# Patient Record
Sex: MALE | Race: WHITE | NOT HISPANIC OR LATINO | Employment: FULL TIME | URBAN - METROPOLITAN AREA
[De-identification: names, ages, dates, MRNs, and addresses within clinical notes are randomized per-mention and may not be internally consistent; named-entity substitution may affect disease eponyms.]

---

## 2017-02-15 ENCOUNTER — OFFICE VISIT - HEALTHEAST (OUTPATIENT)
Dept: FAMILY MEDICINE | Facility: CLINIC | Age: 47
End: 2017-02-15

## 2017-02-15 DIAGNOSIS — H10.13 ALLERGIC CONJUNCTIVITIS, BILATERAL: ICD-10-CM

## 2017-02-15 ASSESSMENT — MIFFLIN-ST. JEOR: SCORE: 1878.23

## 2017-02-21 ENCOUNTER — COMMUNICATION - HEALTHEAST (OUTPATIENT)
Dept: FAMILY MEDICINE | Facility: CLINIC | Age: 47
End: 2017-02-21

## 2017-03-10 ENCOUNTER — HOSPITAL ENCOUNTER (EMERGENCY)
Facility: CLINIC | Age: 47
Discharge: HOME | End: 2017-03-10
Attending: EMERGENCY MEDICINE | Admitting: EMERGENCY MEDICINE

## 2017-03-10 ENCOUNTER — APPOINTMENT (OUTPATIENT)
Dept: GENERAL RADIOLOGY | Facility: CLINIC | Age: 47
End: 2017-03-10
Attending: EMERGENCY MEDICINE

## 2017-03-10 VITALS
BODY MASS INDEX: 28.44 KG/M2 | SYSTOLIC BLOOD PRESSURE: 146 MMHG | WEIGHT: 210 LBS | DIASTOLIC BLOOD PRESSURE: 79 MMHG | TEMPERATURE: 98.2 F | RESPIRATION RATE: 18 BRPM | HEIGHT: 72 IN | OXYGEN SATURATION: 99 %
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
## 2017-03-10 DIAGNOSIS — S61.211A LACERATION OF LEFT INDEX FINGER WITHOUT FOREIGN BODY WITHOUT DAMAGE TO NAIL, INITIAL ENCOUNTER: ICD-10-CM

## 2017-03-10 DIAGNOSIS — S61.219A FINGER LACERATION, INITIAL ENCOUNTER: ICD-10-CM

## 2017-03-10 PROCEDURE — 99283 EMERGENCY DEPT VISIT LOW MDM: CPT | Mod: 25 | Performed by: EMERGENCY MEDICINE

## 2017-03-10 PROCEDURE — 12001 RPR S/N/AX/GEN/TRNK 2.5CM/<: CPT | Mod: Z6 | Performed by: EMERGENCY MEDICINE

## 2017-03-10 PROCEDURE — 99283 EMERGENCY DEPT VISIT LOW MDM: CPT | Performed by: EMERGENCY MEDICINE

## 2017-03-10 PROCEDURE — 73140 X-RAY EXAM OF FINGER(S): CPT | Mod: LT

## 2017-03-10 PROCEDURE — 12001 RPR S/N/AX/GEN/TRNK 2.5CM/<: CPT | Performed by: EMERGENCY MEDICINE

## 2017-03-10 RX ORDER — LIDOCAINE HYDROCHLORIDE AND EPINEPHRINE 10; 10 MG/ML; UG/ML
1 INJECTION, SOLUTION INFILTRATION; PERINEURAL
Status: DISCONTINUED | OUTPATIENT
Start: 2017-03-10 | End: 2017-03-10 | Stop reason: HOSPADM

## 2017-03-10 ASSESSMENT — ENCOUNTER SYMPTOMS
SHORTNESS OF BREATH: 0
FEVER: 0
ABDOMINAL PAIN: 0

## 2017-03-10 NOTE — ED AVS SNAPSHOT
[facility], [place], Emergency Department    [address] [place] [address]    Phone:  [phone]                                       Alexy Bridges   MRN: [id number]    Department:  [facility], Emergency Department   Date of Visit:  3/10/2017           After Visit Summary Signature Page     I have received my discharge instructions, and my questions have been answered. I have discussed any challenges I see with this plan with the nurse or doctor.    ..........................................................................................................................................  Patient/Patient Representative Signature      ..........................................................................................................................................  Patient Representative Print Name and Relationship to Patient    ..................................................               ................................................  Date                                            Time    ..........................................................................................................................................  Reviewed by Signature/Title    ...................................................              ..............................................  Date                                                            Time

## 2017-03-10 NOTE — DISCHARGE INSTRUCTIONS
Please make an appointment to follow up with Your Primary Care Provider for suture removal in 10-14 days.

## 2017-03-10 NOTE — ED AVS SNAPSHOT
" [facility], Emergency Department    [address] [facility] [address]    Phone:  [phone]                                       Alexy Bridges   MRN: [id number]    Department:  [facility], Emergency Department   Date of Visit:  3/10/2017           Patient Information     Date Of Birth          1970        Your diagnoses for this visit were:     Finger laceration, initial encounter        You were seen by Riley Sin MD.        Discharge Instructions       Please make an appointment to follow up with Your Primary Care Provider for suture removal in 10-14 days.      Discharge References/Attachments     LACERATION, HAND: ALL CLOSURES (ENGLISH)      24 Hour Appointment Hotline       To make an appointment at any [facility] clinic, call [phone] ([phone]). If you don't have a family doctor or clinic, we will help you find one. [facility] clinics are conveniently located to serve the needs of you and your family.             Review of your medicines      Notice     You have not been prescribed any medications.            Procedures and tests performed during your visit     Fingers XR, 2-3 views, left      Orders Needing Specimen Collection     None      Pending Results     No orders found from 3/8/2017 to 3/11/2017.            Pending Culture Results     No orders found from 3/8/2017 to 3/11/2017.            Thank you for choosing [facility]       Thank you for choosing [facility] for your care. Our goal is always to provide you with excellent care. Hearing back from our patients is one way we can continue to improve our services. Please take a few minutes to complete the written survey that you may receive in the mail after you visit with us. Thank you!        [employer] Information     [employer] lets you send messages to your doctor, view your test results, renew your prescriptions, schedule appointments and more. To sign up, go to [email or web address] . Click on \"Log in\" on the left " "side of the screen, which will take you to the Welcome page. Then click on \"Sign up Now\" on the right side of the page.     You will be asked to enter the access code listed below, as well as some personal information. Please follow the directions to create your username and password.     Your access code is: [email or web address]  Expires: 2017  1:25 PM     Your access code will  in 90 days. If you need help or a new code, please call your [facility] clinic or [phone].        Care EveryWhere ID     This is your Care EveryWhere ID. This could be used by other organizations to access your [facility] medical records  [id number]        After Visit Summary       This is your record. Keep this with you and show to your community pharmacist(s) and doctor(s) at your next visit.                  "

## 2017-03-10 NOTE — ED NOTES
Pt's finger dressed. He was provided with wound care instructions and dressing change supplies. He verbalized understanding and denied further questions. Ready to D/C.

## 2017-03-10 NOTE — ED NOTES
Alert orientated ambulatory patient presents to ER triage with c/o:    1.) Finger injury    Hx: Belt arcadio vs Left pointer finger.  This injury occurred at place of employment.  Patient believes he had tetanus shot within past 5 years.      Airway WDLs  Breathing WDLs  Circulation WDLs

## 2017-03-10 NOTE — ED PROVIDER NOTES
History     Chief Complaint   Patient presents with     Laceration     HPI  Alexy Bridges is a 47 year old right hand dominant male who presents to the Emergency Department for evaluation of a laceration. Twenty minutes ago, the patient was at work using a belt arcadio when his left pointer finger got caught, sustaining a laceration. He denies any other concerns or complaints at this time. Tetanus up to date as of May 2016.    No past medical history on file.    No past surgical history on file.    No family history on file.    Social History   Substance Use Topics     Smoking status: Not on file     Smokeless tobacco: Not on file     Alcohol use Not on file       No current facility-administered medications for this encounter.      No current outpatient prescriptions on file.      No Known Allergies     I have reviewed the Medications, Allergies, Past Medical and Surgical History, and Social History in the Epic system.    Review of Systems   Constitutional: Negative for fever.   Respiratory: Negative for shortness of breath.    Cardiovascular: Negative for chest pain.   Gastrointestinal: Negative for abdominal pain.   Skin:        Positive for laceration to left pointer finger.   All other systems reviewed and are negative.      Physical Exam   BP: 146/79  Heart Rate: 80  Temp: 98.2  F (36.8  C)  Resp: 18  Height: 182.9 cm (6')  Weight: 95.3 kg (210 lb)  SpO2: 99 %  Physical Exam   Constitutional: He is oriented to person, place, and time. He appears well-developed and well-nourished. No distress.   HENT:   Head: Normocephalic and atraumatic.   Eyes: No scleral icterus.   Neck: Normal range of motion. Neck supple.   Pulmonary/Chest: No respiratory distress.   Musculoskeletal:        Hands:  Neurological: He is alert and oriented to person, place, and time.   Skin: Skin is warm and dry. No rash noted. He is not diaphoretic. No erythema. No pallor.       ED Course     11:22 AM  The patient was seen and examined by   [facility] in Room 23.     ED Course     Laceration repair  Date/Time: 3/10/2017 12:49 AM  Performed by: TRES KAY  Authorized by: TRES KAY   Consent: Verbal consent obtained.  Consent given by: patient  Patient identity confirmed: verbally with patient  Body area: upper extremity  Location details: left index finger  Laceration length: 2 cm  Foreign bodies: no foreign bodies  Tendon involvement: none  Nerve involvement: none  Vascular damage: no  Anesthesia: digital block    Anesthesia:  Anesthesia: digital block  Local Anesthetic: lidocaine 1% with epinephrine   Anesthetic total: 3 mL  Irrigation solution: saline  Irrigation method: syringe  Debridement: none  Degree of undermining: none  Skin closure: 4-0 nylon  Number of sutures: 7  Technique: simple  Approximation: close  Approximation difficulty: simple  Dressing: 4x4 sterile gauze                   Critical Care time:  none         Results for orders placed or performed during the hospital encounter of 03/10/17   Fingers XR, 2-3 views, left    Narrative    Exam: 3 views left second digit radiographs    HISTORY: Large laceration. Assess for bony involvement.    COMPARISON: None.    FINDINGS:    PA, oblique, lateral view left second digit were obtained.    There is soft tissue defect over the left second digit at the level of  the distal interphalangeal joint and more distally. Associated soft  tissue swelling. No evidence of acute osseous abnormality. There is no  suspicious osteolysis of the underlying bone. There is no radiopaque  foreign body. Mild degenerative changes of second distal and proximal  interphalangeal joints.      Impression    IMPRESSION:  1. No acute osseous abnormality.  2. No radiopaque foreign body.    YAYO TIKA              Labs Ordered and Resulted from Time of ED Arrival Up to the Time of Departure from the ED - No data to display    Assessments & Plan (with Medical Decision Making)   This is a 47-year-old  male presenting to the emergency room with a large laceration on his left index finger after getting his finger caught in a sand belt.  X-ray confirms that there is no foreign body and no bone involvement.  The wound was sutured here in the emergency room.  At this time the patient will be discharged with after care instructions.  Tetanus was already updated last year.  At this time the patient will return an approximate 10-14 days for wound assessment and possible suture removal.    I have reviewed the nursing notes.    I have reviewed the findings, diagnosis, plan and need for follow up with the patient.    There are no discharge medications for this patient.      Final diagnoses:   Finger laceration, initial encounter     IGilda, am serving as a trained medical scribe to document services personally performed by Riley Sin MD, based on the provider's statements to me.      IRiley MD, was physically present and have reviewed and verified the accuracy of this note documented by Gilda Castillo.     3/10/2017   [facility], [place], EMERGENCY DEPARTMENT     Riley Sin MD  03/13/17 0051

## 2017-03-13 ENCOUNTER — COMMUNICATION - HEALTHEAST (OUTPATIENT)
Dept: FAMILY MEDICINE | Facility: CLINIC | Age: 47
End: 2017-03-13

## 2017-03-20 ENCOUNTER — OFFICE VISIT - HEALTHEAST (OUTPATIENT)
Dept: FAMILY MEDICINE | Facility: CLINIC | Age: 47
End: 2017-03-20

## 2017-03-20 DIAGNOSIS — H11.9 CONJUNCTIVA DISORDER: ICD-10-CM

## 2019-06-12 ENCOUNTER — OFFICE VISIT - HEALTHEAST (OUTPATIENT)
Dept: FAMILY MEDICINE | Facility: CLINIC | Age: 49
End: 2019-06-12

## 2019-06-12 DIAGNOSIS — H10.13 ALLERGIC CONJUNCTIVITIS, BILATERAL: ICD-10-CM

## 2021-01-31 ENCOUNTER — RECORDS - HEALTHEAST (OUTPATIENT)
Dept: ADMINISTRATIVE | Facility: OTHER | Age: 51
End: 2021-01-31

## 2021-05-13 ENCOUNTER — OFFICE VISIT - HEALTHEAST (OUTPATIENT)
Dept: FAMILY MEDICINE | Facility: CLINIC | Age: 51
End: 2021-05-13

## 2021-05-13 ENCOUNTER — COMMUNICATION - HEALTHEAST (OUTPATIENT)
Dept: TELEHEALTH | Facility: CLINIC | Age: 51
End: 2021-05-13

## 2021-05-13 ENCOUNTER — RECORDS - HEALTHEAST (OUTPATIENT)
Dept: GENERAL RADIOLOGY | Facility: CLINIC | Age: 51
End: 2021-05-13

## 2021-05-13 DIAGNOSIS — M25.552 PAIN IN LEFT HIP: ICD-10-CM

## 2021-05-13 DIAGNOSIS — M25.552 HIP PAIN, LEFT: ICD-10-CM

## 2021-05-13 DIAGNOSIS — N50.89 MASS OF LEFT TESTICLE: ICD-10-CM

## 2021-05-17 ENCOUNTER — HOSPITAL ENCOUNTER (OUTPATIENT)
Dept: ULTRASOUND IMAGING | Facility: CLINIC | Age: 51
Discharge: HOME | End: 2021-05-17
Attending: FAMILY MEDICINE
Payer: COMMERCIAL

## 2021-05-17 ENCOUNTER — COMMUNICATION - HEALTHEAST (OUTPATIENT)
Dept: FAMILY MEDICINE | Facility: CLINIC | Age: 51
End: 2021-05-17

## 2021-05-17 DIAGNOSIS — N50.89 MASS OF LEFT TESTICLE: ICD-10-CM

## 2021-05-25 ENCOUNTER — RECORDS - HEALTHEAST (OUTPATIENT)
Dept: ADMINISTRATIVE | Facility: CLINIC | Age: 51
End: 2021-05-25

## 2021-05-27 VITALS
BODY MASS INDEX: 30.3 KG/M2 | WEIGHT: 223.4 LBS | DIASTOLIC BLOOD PRESSURE: 76 MMHG | SYSTOLIC BLOOD PRESSURE: 126 MMHG | HEART RATE: 74 BPM | OXYGEN SATURATION: 98 %

## 2021-05-29 ENCOUNTER — RECORDS - HEALTHEAST (OUTPATIENT)
Dept: ADMINISTRATIVE | Facility: CLINIC | Age: 51
End: 2021-05-29

## 2021-05-29 NOTE — PROGRESS NOTES
Assessment/Plan:    1. Allergic conjunctivitis, bilateral  Suspect bilateral allergic conjunctivitis based on history and exam.  Patient reports significant improvement in the past with prednisolone drops, discussed reasonable to use this therapy again, prescription sent to pharmacy.  Encourage patient to refrain from contact use until symptoms have resolved.  Discussed consideration of oral allergy medication trial.  - prednisoLONE acetate (PRED-FORTE) 1 % ophthalmic suspension; Administer 2 drops to both eyes 3 (three) times a day for 10 days.  Dispense: 5 mL; Refill: 0      Follow up: as needed for persistent/worsening symptoms, 1 yr for physical    Jessenia Seo MD  [facility]    Subjective:    Patient ID: Alexy Bridges is a 49 y.o. male is here today for itchy/red eyes    Itchy/red eyes  -similar issues in the past - wondering if allergies or pollen; works as [employer]  -started 2 weeks ago  -has been using antihistamine drops (but only used a little because was ), felt like steroid drops were helpful in the past  -hasn't been using any oral allergy medication  -does wear contacts but not wearing currently  -saw Dr Garner in 2017 for this issue and then previously Dr Campbell  -sees eye doctor once every 2 years - they are aware of this recurrent issue with the eyes    No past medical history on file.  No past surgical history on file.  Current Outpatient Medications on File Prior to Visit   Medication Sig Dispense Refill     MULTIVITAMIN ORAL Take by mouth.       OMEGA-3/DHA/EPA/FISH OIL (FISH OIL-OMEGA-3 FATTY ACIDS) 300-1,000 mg capsule Take 1 g by mouth daily.       No current facility-administered medications on file prior to visit.      Allergies   Allergen Reactions     Tobramycin Swelling     Eye drops     Social History     Socioeconomic History     Marital status:      Spouse name: Not on file     Number of children: Not on file     Years of education: Not on file      Highest education level: Not on file   Occupational History     Not on file   Social Needs     Financial resource strain: Not on file     Food insecurity:     Worry: Not on file     Inability: Not on file     Transportation needs:     Medical: Not on file     Non-medical: Not on file   Tobacco Use     Smoking status: Former Smoker     Packs/day: 0.50     Years: 10.00     Pack years: 5.00     Last attempt to quit:      Years since quittin.4     Smokeless tobacco: Never Used   Substance and Sexual Activity     Alcohol use: Yes     Frequency: 2-3 times a week     Drinks per session: 1 or 2     Binge frequency: Never     Drug use: No     Sexual activity: Not on file   Lifestyle     Physical activity:     Days per week: Not on file     Minutes per session: Not on file     Stress: Not on file   Relationships     Social connections:     Talks on phone: Not on file     Gets together: Not on file     Attends [employer] service: Not on file     Active member of club or organization: Not on file     Attends meetings of clubs or organizations: Not on file     Relationship status: Not on file     Intimate partner violence:     Fear of current or ex partner: Not on file     Emotionally abused: Not on file     Physically abused: Not on file     Forced sexual activity: Not on file   Other Topics Concern     Not on file   Social History Narrative     Not on file     Review of systems is as stated in HPI, and the remainder of system review is otherwise negative.    Objective:      /70   Pulse 67   Wt (!) 222 lb 3 oz (100.8 kg)   BMI 30.13 kg/m      General appearance: awake, NAD  HEENT: atraumatic, normocephalic, PERRL, bilateral scleral injection without discharge or watering, ears and nose grossly normal, moist mucous membranes  Neck: normal ROM  Lungs: breathing comfortably on room air  Skin: no rashes or lesions  Neuro: alert, oriented x3, CNs grossly intact, no focal deficits appreciated  Psych: normal  mood/affect/behavior, answering questions appropriately, linear thought process

## 2021-05-30 VITALS — HEIGHT: 72 IN | WEIGHT: 215 LBS | BODY MASS INDEX: 29.12 KG/M2

## 2021-05-30 VITALS — BODY MASS INDEX: 28.96 KG/M2 | WEIGHT: 213.5 LBS

## 2021-06-02 ENCOUNTER — RECORDS - HEALTHEAST (OUTPATIENT)
Dept: ADMINISTRATIVE | Facility: CLINIC | Age: 51
End: 2021-06-02

## 2021-06-03 VITALS — BODY MASS INDEX: 30.13 KG/M2 | WEIGHT: 222.19 LBS

## 2021-06-09 NOTE — PROGRESS NOTES
Assessment:     Alexy was seen today for dry eye.    Diagnoses and all orders for this visit:    Conjunctiva disorder  -     prednisoLONE acetate (PRED MILD) 0.12 % ophthalmic suspension; Use 2 drops bid to both eyes.        Plan:     1. Conjunctiva disorder  My plan for patient is this.  He is to keep his contacts out and wear his glasses.  He is to use the os mild steroid eyedrop 2 drops twice a day over the next 5-7 days.  If the eye heals well he switched to keep the contacts out of his eyes and additional 5 days.  He can then resume contacts after this.  If I does not heal well he is to make an appointment with [facility] Dr. Susan Quick one of her partners.  He is to remain free of wearing contacts up until the time he sees the ophthalmic doctors.  I tell him he'll save a lot of time that way otherwise he'll just need a trial of being out of his contacts.  Further plans pinning course.    Please note patient has a laceration and avulsion on his index finger on the right area after inspecting of this finger we decide not to remove the Steri-Strips currently at 10 days time but he should wait about 14 days time and then will soak it and remove the Steri-Strips.  He'll come back and see me or Dr. Sonam Campbell his primary.  - prednisoLONE acetate (PRED MILD) 0.12 % ophthalmic suspension; Use 2 drops bid to both eyes.  Dispense: 5 mL; Refill: 0            Subjective:      Nora is a 47 y.o. male presenting to my clinic for follow-up of eye problem.  Patient has conjunctival issues and was given an antihistamine eyedrop in January.  This did not seem to work and an antibiotic eyedrop ciprofloxacin was used.  That didn't seem to help either.  Throughout this time patient's been wearing his contacts and says putting in his contacts doesn't seem to irritate him.  He does not have any purulent drainage about the eyes.  He is not complaining of upper respiratory infection.  He does not complain of sinus  problems or cough.  He's had no fevers.    He once had this problem before and is steroid eyedrop helped him.  I'm thinking that he is overusing his contacts causing a chronic irritation.  I put a planning place for steroid drops and then follow-up with ophthalmologist at [facility] if needed.      Patient's had any avulsion laceration on his right index finger.  It's been stitched and Steri-Strips are placed.  He is due to have sutures removed today is day 10 and he is tenuous about removing Steri-Strips at this point and we suggest he wait a day for 14 before area is uncovered further.      Current Outpatient Prescriptions on File Prior to Visit   Medication Sig Dispense Refill     MULTIVITAMIN ORAL Take by mouth.       OMEGA-3/DHA/EPA/FISH OIL (FISH OIL-OMEGA-3 FATTY ACIDS) 300-1,000 mg capsule Take 1 g by mouth daily.       azelastine (OPTIVAR) 0.05 % ophthalmic solution Administer 1 drop to both eyes 2 (two) times a day. 6 mL 1     No current facility-administered medications on file prior to visit.      Allergies   Allergen Reactions     Tobramycin Swelling     Eye drops     History reviewed. No pertinent past medical history.  History reviewed. No pertinent surgical history.  Social History     Social History     Marital status:      Spouse name: N/A     Number of children: N/A     Years of education: N/A     Occupational History     Not on file.     Social History Main Topics     Smoking status: Former Smoker     Smokeless tobacco: Never Used     Alcohol use 2.5 oz/week     5 Standard drinks or equivalent per week     Drug use: No     Sexual activity: Not on file     Other Topics Concern     Not on file     Social History Narrative     Family History   Problem Relation Age of Onset     Pancreatitis Mother      Diabetes Mother      COPD Father      Aortic stenosis Father        ROS:  I have performed a 10 point ROS.  All pertinent positives and negatives are found in the HPI.  All others are negative.     No fever no chills.  Index finger does not hurt.  He says it does not hurt to put his contacts in    Objective:     Physical Exam:  Visit Vitals     /80 (Patient Site: Right Arm, Patient Position: Sitting, Cuff Size: Adult Regular)     Pulse 72     Wt 213 lb 8 oz (96.8 kg)     BMI 28.96 kg/m2     General Appearance: Alert, cooperative, no distress, appears stated age  Head: Normocephalic, without obvious abnormality, atraumatic  Eyes: Extraocular motions are intact.  Pupils are equally round and reactive to light.  Patient has conjunctival irritation most prominent on the edges of his contacts.  Contacts are present currently in his eyes./  No irritation wearing the contacts he tells me  Ears: Normal TM's and external ear canals, both ears  Nose: Nares normal, septum midline,mucosa normal, no drainage/no symptoms of upper respiratory infection or sinus pain  Throat: Lips, mucosa, and tongue normal; teeth and gums normal/minimal clear drainage down throat  Neck: Supple, symmetrical, trachea midline, no adenopathy;  thyroid: not enlarged, symmetric, no tenderness/mass/nodules; no carotid bruit or JVD  Lungs: Clear to auscultation bilaterally, respirations unlabored  Heart: Regular rate and rhythm, S1 and S2 normal, no murmur, rub, or gallop,     Skin: Skin color, texture, turgor normal, no rashes or lesions/no periorbital skin problems  Lymph nodes: Cervical, supraclavicular, and axillary nodes normal  Neurologic: Intact, no focal deficits   Mental status:  Appropriate, Affect normal    I reviewed notes and phone calls and mid history over the past 3 months     Patient has a right index finger that is has any vault flap is sutured and Steri-Stripped.  Dried blood at the edges.  Steri-Strips still intact.  He is currently at 10 days time.  No significant pain or swelling or edema around this site.  We suggest he wait to 14 days to take the sutures out which will be a Thursday.  He can come back to see myself or  Dr. Campbell.

## 2021-06-09 NOTE — PROGRESS NOTES
Assessment/Plan:     1. Allergic conjunctivitis, bilateral  Suspect allergic vs infectious due to somewhat recurrent nature. Recommended eye kit for further eval but pt declines today. States will return if not improving. May also need to consider autoimmune if reoccurs. Also advised to not wear contacts until healed and wear eye protection at work.   - azelastine (OPTIVAR) 0.05 % ophthalmic solution; Administer 1 drop to both eyes 2 (two) times a day.  Dispense: 6 mL; Refill: 1        Subjective:      Alexy Bridges is a 47 y.o. male comes in today c/o possible pink eye. States it has been going on for 2 weeks. 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 No pain in eyes and no changes in vision but states itching. And feels dry. Mild amount of matting in corner when wakes up but no other significant discharge. Sometimes looks a little red. No fever or ather allergy symptoms. States still wearing contacts but washes them out every night.  States similar symptoms in the past and was given tobramycin drops which seemed to work but caused swelling so he thinks he is allergic to them.  No other new concerns    Current Outpatient Prescriptions   Medication Sig Dispense Refill     MULTIVITAMIN ORAL Take by mouth.       OMEGA-3/DHA/EPA/FISH OIL (FISH OIL-OMEGA-3 FATTY ACIDS) 300-1,000 mg capsule Take 1 g by mouth daily.       azelastine (OPTIVAR) 0.05 % ophthalmic solution Administer 1 drop to both eyes 2 (two) times a day. 6 mL 1     No current facility-administered medications for this visit.        Past Medical History, Family History, and Social History reviewed.  No past medical history on file.  No past surgical history on file.  Tobramycin  Family History   Problem Relation Age of Onset     Pancreatitis Mother      Diabetes Mother      COPD Father      Aortic stenosis Father      Social History     Social History     Marital status:      Spouse name: N/A     Number of children:  N/A     Years of education: N/A     Occupational History     Not on file.     Social History Main Topics     Smoking status: Former Smoker     Smokeless tobacco: Never Used     Alcohol use 2.5 oz/week     5 Standard drinks or equivalent per week     Drug use: No     Sexual activity: Not on file     Other Topics Concern     Not on file     Social History Narrative         Review of systems is as stated in HPI, and the remainder of the 10 system review is otherwise negative.    Objective:     Vitals:    02/15/17 0856   BP: 110/64   Patient Site: Left Arm   Patient Position: Sitting   Cuff Size: Adult Regular   Temp: 97.8  F (36.6  C)   TempSrc: Oral   Weight: 215 lb (97.5 kg)   Height: 6' (1.829 m)    Body mass index is 29.16 kg/(m^2).    General Appearance:    Alert, cooperative, no distress, appears stated age   Head:    Normocephalic, without obvious abnormality, atraumatic   Eyes:    PERRL, EOM's intact, no discharge, mildly erythematous sclera and conjunctiva   Ears:    Normal TM's and external ear canals   Nose:   Mucosa normal, no drainage     or sinus tenderness   Throat:   Oropharynx is clear   Neck:   Supple, symmetrical, no adenopathy, no thyromegally, no carotid bruitis       Lungs:     Clear to auscultation bilaterally, respirations unlabored        Heart:    Regular rate and rhythm, S1 and S2 normal, no murmur, rub    or gallop                   Extremities:   Extremities normal, atraumatic, no cyanosis or edema       Skin:   No rashes or lesions         This note has been dictated using voice recognition software. Any grammatical or context distortions are unintentional and inherent to the the software.

## 2021-06-17 NOTE — PROGRESS NOTES
Assessment:     1. Hip pain, left  XR Hip Left 2 or More VWS    naproxen (NAPROSYN) 500 MG tablet   2. Mass of left testicle  US Scrotum and Testicles W Duplex Ltd       Plan:     1. Hip pain, left  Patient will try the medication for 2 or 3 weeks if no improvement would consider further imaging referral or corticosteroid injection  - XR Hip Left 2 or More VWS; Future  - naproxen (NAPROSYN) 500 MG tablet; Take 1 tablet (500 mg total) by mouth 2 (two) times a day with meals.  Dispense: 60 tablet; Refill: 2    2. Mass of left testicle  Probably benign mass just above the left testicle  - US Scrotum and Testicles W Duplex Ltd; Future      Subjective:   Patient here because he played golf and went to pick his card up and developed pain in his left hip which is been intermittent and seems to be relieved by sitting no previous problems with his hip he does have chronic low back problems and sees a chiropractor for that but his manipulations did not help that.  Today's examination ruled out a hernia however on the examination we did notice a mass just above the left testicle which we will ultrasound but I think is benign range of motion hip was normal I could not elicit any click and patient does not have any symptoms of snapping hip.  I did not see anything demonstrable on the plain films but we will defer to radiology for that I will treat him with some anti-inflammatories for 2 weeks he does have some hyper calcific areas in his femur which look like bone islands to me but we will concur with radiology.  I advised him to do monthly testicle checks and to come in at age 52 for complete examination his primary physician is Dr. Seo time will tell her success with the hip.    Review of Systems: A complete 14 point review of systems was obtained and is negative or as stated in the history of present illness.    No past medical history on file.  Family History   Problem Relation Age of Onset     Pancreatitis Mother       Diabetes Mother      COPD Father      Aortic stenosis Father      No Medical Problems Sister      No Medical Problems Brother      No Medical Problems Sister      No past surgical history on file.  Social History     Tobacco Use     Smoking status: Former Smoker     Packs/day: 0.50     Years: 10.00     Pack years: 5.00     Quit date:      Years since quittin.3     Smokeless tobacco: Never Used   Substance Use Topics     Alcohol use: Yes     Frequency: 2-3 times a week     Drinks per session: 1 or 2     Binge frequency: Never     Drug use: No         Objective:   /76   Pulse 74   Wt (!) 223 lb 6.4 oz (101.3 kg)   SpO2 98%   BMI 30.30 kg/m      General Appearance:  Normal  Head:  Normal  Ears: Normal  Eyes:  Normal  Nose:  Normal  Throat:  Normal  Neck:  Normal  Back:  Normal  Chest/Breast:Normal  Lungs:  Normal  Heart:  Normal  Abdomen:  Normal  Musculoskeletal:  Normal  Lymphatic:  Normal  Skin/Hair/Nails:  Normal  Neurologic:  Normal  Extremities: Difficult to elicit tenderness in left hip no click could be confirmed on flexion or extension or external rotation  Genitourinary: Soft kidney bean sized mass supra testicular area on cord  Pulses:  Normal           This note has been dictated using voice recognition software. Any grammatical or context distortions are unintentional and inherent to the the software.

## 2021-07-25 ENCOUNTER — HEALTH MAINTENANCE LETTER (OUTPATIENT)
Age: 51
End: 2021-07-25

## 2021-09-19 ENCOUNTER — HEALTH MAINTENANCE LETTER (OUTPATIENT)
Age: 51
End: 2021-09-19

## 2021-10-05 ENCOUNTER — OFFICE VISIT (OUTPATIENT)
Dept: FAMILY MEDICINE | Facility: CLINIC | Age: 51
End: 2021-10-05
Payer: COMMERCIAL

## 2021-10-05 VITALS
SYSTOLIC BLOOD PRESSURE: 122 MMHG | BODY MASS INDEX: 30.66 KG/M2 | DIASTOLIC BLOOD PRESSURE: 62 MMHG | OXYGEN SATURATION: 97 % | HEART RATE: 57 BPM | WEIGHT: 226.1 LBS

## 2021-10-05 DIAGNOSIS — H10.13 ALLERGIC CONJUNCTIVITIS, BILATERAL: Primary | ICD-10-CM

## 2021-10-05 PROCEDURE — 99213 OFFICE O/P EST LOW 20 MIN: CPT | Performed by: NURSE PRACTITIONER

## 2021-10-05 RX ORDER — CHLORAL HYDRATE 500 MG
1 CAPSULE ORAL
COMMUNITY

## 2021-10-05 RX ORDER — PREDNISOLONE ACETATE 10 MG/ML
2 SUSPENSION/ DROPS OPHTHALMIC 3 TIMES DAILY
Qty: 5 ML | Refills: 0 | Status: SHIPPED | OUTPATIENT
Start: 2021-10-05 | End: 2021-10-15

## 2021-10-05 RX ORDER — MULTIVITAMIN,THERAPEUTIC
1 TABLET ORAL DAILY
COMMUNITY

## 2021-10-05 NOTE — PROGRESS NOTES
Assessment and Plan:     Allergic conjunctivitis, bilateral  Discussed treatment options.  Provided prescription for prednisolone.  Educated on its indications and side effects.  Recommend applying cool compresses.  He is to avoid rubbing his eyes.  If no improvement in symptoms, he is to follow-up with his PCP.  He is content with the plan.  - prednisoLONE acetate (PRED FORTE) 1 % ophthalmic suspension  Dispense: 5 mL; Refill: 0      Subjective:     Alexy is a 51 year old male presenting to the clinic for concerns for conjunctivitis.  Patient states he has been remodeling and working around dust.  Over the past 3 weeks, he has had red eyes.  He does wear contact lens.  He has tried Visine with minimal relief.  Eyes are dry.  He denies any itching.  He has not had any discharge from the eyes.  He denies foreign body, photophobia, eye pain, decrease in visual acuity.  He denies recent allergy symptoms including rhinorrhea, postnasal drainage, cough, sneezing.  He has not had any recent cold symptoms otherwise.  He has tried antihistamine eyedrops in the past with no relief.  He typically requires prednisolone for relief.    Reviewof Systems: A complete 14 point review of systems was obtained and is negative or as stated in the history of present illness.    Social History     Socioeconomic History     Marital status:      Spouse name: Not on file     Number of children: Not on file     Years of education: Not on file     Highest education level: Not on file   Occupational History     Not on file   Tobacco Use     Smoking status: Former Smoker     Packs/day: 0.50     Years: 10.00     Pack years: 5.00     Quit date: 2007     Years since quittin.7     Smokeless tobacco: Never Used   Substance and Sexual Activity     Alcohol use: Yes     Drug use: No     Sexual activity: Not on file   Other Topics Concern     Not on file   Social History Narrative     Not on file     Social Determinants of Health      Financial Resource Strain:      Difficulty of Paying Living Expenses:    Food Insecurity:      Worried About Running Out of Food in the Last Year:      Ran Out of Food in the Last Year:    Transportation Needs:      Lack of Transportation (Medical):      Lack of Transportation (Non-Medical):    Physical Activity:      Days of Exercise per Week:      Minutes of Exercise per Session:    Stress:      Feeling of Stress :    Social Connections:      Frequency of Communication with Friends and Family:      Frequency of Social Gatherings with Friends and Family:      Attends Voodoo Services:      Active Member of Clubs or Organizations:      Attends Club or Organization Meetings:      Marital Status:    Intimate Partner Violence:      Fear of Current or Ex-Partner:      Emotionally Abused:      Physically Abused:      Sexually Abused:        Active Ambulatory Problems     Diagnosis Date Noted     No Active Ambulatory Problems     Resolved Ambulatory Problems     Diagnosis Date Noted     No Resolved Ambulatory Problems     No Additional Past Medical History       Family History   Problem Relation Age of Onset     Pancreatitis Mother      Diabetes Mother      Chronic Obstructive Pulmonary Disease Father      Aortic stenosis Father      No Known Problems Sister      No Known Problems Brother      No Known Problems Sister        Objective:     /62 (BP Location: Right arm, Patient Position: Sitting, Cuff Size: Adult Regular)   Pulse 57   Wt 102.6 kg (226 lb 1.6 oz)   SpO2 97%   BMI 30.66 kg/m      Patient is alert, in no obvious distress.   Skin: Warm, dry.  No lesions or rashes.  Skin turgor rapid return.   HEENT:  Head normocephalic, atraumatic.  Eyes normal.  PERRL.  EOM's intact.  No nystagmus. Conjunctiva is injected bilaterally.  Ears normal.  Nose patent, mucosa pink.  Oropharynx mucosa pink.  No lesions or tonsillar enlargement.   Neck: Supple, no lymphadenopathy.   Lungs:  Clear to auscultation.  Respirations even and unlabored.  No wheezing or rales noted.   Heart:  Regular rate and rhythm.  No murmurs.

## 2022-08-21 ENCOUNTER — HEALTH MAINTENANCE LETTER (OUTPATIENT)
Age: 52
End: 2022-08-21

## 2022-11-21 ENCOUNTER — HEALTH MAINTENANCE LETTER (OUTPATIENT)
Age: 52
End: 2022-11-21

## 2023-07-12 ENCOUNTER — HOSPITAL ENCOUNTER (EMERGENCY)
Facility: CLINIC | Age: 53
Discharge: HOME | End: 2023-07-13
Attending: EMERGENCY MEDICINE | Admitting: EMERGENCY MEDICINE
Payer: COMMERCIAL

## 2023-07-12 ENCOUNTER — APPOINTMENT (OUTPATIENT)
Dept: RADIOLOGY | Facility: CLINIC | Age: 53
End: 2023-07-12
Attending: EMERGENCY MEDICINE
Payer: COMMERCIAL
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
## 2023-07-12 DIAGNOSIS — S22.32XA CLOSED FRACTURE OF ONE RIB OF LEFT SIDE, INITIAL ENCOUNTER: ICD-10-CM

## 2023-07-12 DIAGNOSIS — S30.1XXA ABDOMINAL WALL HEMATOMA, INITIAL ENCOUNTER: ICD-10-CM

## 2023-07-12 PROCEDURE — 99285 EMERGENCY DEPT VISIT HI MDM: CPT | Mod: 25

## 2023-07-12 PROCEDURE — 71101 X-RAY EXAM UNILAT RIBS/CHEST: CPT | Mod: LT

## 2023-07-12 PROCEDURE — 96361 HYDRATE IV INFUSION ADD-ON: CPT

## 2023-07-12 PROCEDURE — 96374 THER/PROPH/DIAG INJ IV PUSH: CPT | Mod: 59

## 2023-07-13 ENCOUNTER — APPOINTMENT (OUTPATIENT)
Dept: CT IMAGING | Facility: CLINIC | Age: 53
End: 2023-07-13
Attending: EMERGENCY MEDICINE
Payer: COMMERCIAL

## 2023-07-13 VITALS
SYSTOLIC BLOOD PRESSURE: 119 MMHG | BODY MASS INDEX: 29.12 KG/M2 | OXYGEN SATURATION: 98 % | WEIGHT: 215 LBS | HEIGHT: 72 IN | RESPIRATION RATE: 18 BRPM | HEART RATE: 52 BPM | TEMPERATURE: 97.9 F | DIASTOLIC BLOOD PRESSURE: 77 MMHG

## 2023-07-13 LAB
ALBUMIN SERPL-MCNC: 3.7 G/DL (ref 3.5–5)
ALP SERPL-CCNC: 56 U/L (ref 45–120)
ALT SERPL W P-5'-P-CCNC: 23 U/L (ref 0–45)
ANION GAP SERPL CALCULATED.3IONS-SCNC: 9 MMOL/L (ref 5–18)
AST SERPL W P-5'-P-CCNC: 20 U/L (ref 0–40)
BASOPHILS # BLD AUTO: 0 10E3/UL (ref 0–0.2)
BASOPHILS NFR BLD AUTO: 1 %
BILIRUB SERPL-MCNC: 0.4 MG/DL (ref 0–1)
BUN SERPL-MCNC: 25 MG/DL (ref 8–22)
CALCIUM SERPL-MCNC: 8.9 MG/DL (ref 8.5–10.5)
CHLORIDE BLD-SCNC: 109 MMOL/L (ref 98–107)
CO2 SERPL-SCNC: 23 MMOL/L (ref 22–31)
CREAT SERPL-MCNC: 1.33 MG/DL (ref 0.7–1.3)
EOSINOPHIL # BLD AUTO: 0.1 10E3/UL (ref 0–0.7)
EOSINOPHIL NFR BLD AUTO: 2 %
ERYTHROCYTE [DISTWIDTH] IN BLOOD BY AUTOMATED COUNT: 13.1 % (ref 10–15)
GFR SERPL CREATININE-BSD FRML MDRD: 65 ML/MIN/1.73M2
GLUCOSE BLD-MCNC: 109 MG/DL (ref 70–125)
HCT VFR BLD AUTO: 43.4 % (ref 40–53)
HGB BLD-MCNC: 14.9 G/DL (ref 13.3–17.7)
IMM GRANULOCYTES # BLD: 0 10E3/UL
IMM GRANULOCYTES NFR BLD: 1 %
LYMPHOCYTES # BLD AUTO: 1.9 10E3/UL (ref 0.8–5.3)
LYMPHOCYTES NFR BLD AUTO: 31 %
MCH RBC QN AUTO: 31.8 PG (ref 26.5–33)
MCHC RBC AUTO-ENTMCNC: 34.3 G/DL (ref 31.5–36.5)
MCV RBC AUTO: 93 FL (ref 78–100)
MONOCYTES # BLD AUTO: 0.5 10E3/UL (ref 0–1.3)
MONOCYTES NFR BLD AUTO: 8 %
NEUTROPHILS # BLD AUTO: 3.5 10E3/UL (ref 1.6–8.3)
NEUTROPHILS NFR BLD AUTO: 57 %
NRBC # BLD AUTO: 0 10E3/UL
NRBC BLD AUTO-RTO: 0 /100
PLATELET # BLD AUTO: 205 10E3/UL (ref 150–450)
POTASSIUM BLD-SCNC: 3.9 MMOL/L (ref 3.5–5)
PROT SERPL-MCNC: 6.6 G/DL (ref 6–8)
RBC # BLD AUTO: 4.69 10E6/UL (ref 4.4–5.9)
SODIUM SERPL-SCNC: 141 MMOL/L (ref 136–145)
WBC # BLD AUTO: 6.1 10E3/UL (ref 4–11)

## 2023-07-13 PROCEDURE — 85014 HEMATOCRIT: CPT | Performed by: EMERGENCY MEDICINE

## 2023-07-13 PROCEDURE — 250N000013 HC RX MED GY IP 250 OP 250 PS 637: Performed by: EMERGENCY MEDICINE

## 2023-07-13 PROCEDURE — 258N000003 HC RX IP 258 OP 636: Performed by: EMERGENCY MEDICINE

## 2023-07-13 PROCEDURE — 250N000011 HC RX IP 250 OP 636: Mod: JZ | Performed by: EMERGENCY MEDICINE

## 2023-07-13 PROCEDURE — 80053 COMPREHEN METABOLIC PANEL: CPT | Performed by: EMERGENCY MEDICINE

## 2023-07-13 PROCEDURE — 36415 COLL VENOUS BLD VENIPUNCTURE: CPT | Performed by: EMERGENCY MEDICINE

## 2023-07-13 PROCEDURE — 74177 CT ABD & PELVIS W/CONTRAST: CPT

## 2023-07-13 PROCEDURE — 250N000011 HC RX IP 250 OP 636: Performed by: EMERGENCY MEDICINE

## 2023-07-13 RX ORDER — IOPAMIDOL 755 MG/ML
90 INJECTION, SOLUTION INTRAVASCULAR ONCE
Status: COMPLETED | OUTPATIENT
Start: 2023-07-13 | End: 2023-07-13

## 2023-07-13 RX ORDER — HYDROCODONE BITARTRATE AND ACETAMINOPHEN 5; 325 MG/1; MG/1
2 TABLET ORAL ONCE
Status: COMPLETED | OUTPATIENT
Start: 2023-07-13 | End: 2023-07-13

## 2023-07-13 RX ORDER — KETOROLAC TROMETHAMINE 15 MG/ML
15 INJECTION, SOLUTION INTRAMUSCULAR; INTRAVENOUS ONCE
Status: COMPLETED | OUTPATIENT
Start: 2023-07-13 | End: 2023-07-13

## 2023-07-13 RX ORDER — HYDROCODONE BITARTRATE AND ACETAMINOPHEN 5; 325 MG/1; MG/1
1 TABLET ORAL EVERY 6 HOURS PRN
Qty: 10 TABLET | Refills: 0 | Status: SHIPPED | OUTPATIENT
Start: 2023-07-13 | End: 2023-07-16

## 2023-07-13 RX ADMIN — KETOROLAC TROMETHAMINE 15 MG: 15 INJECTION, SOLUTION INTRAMUSCULAR; INTRAVENOUS at 00:25

## 2023-07-13 RX ADMIN — SODIUM CHLORIDE 1000 ML: 9 INJECTION, SOLUTION INTRAVENOUS at 01:34

## 2023-07-13 RX ADMIN — HYDROCODONE BITARTRATE AND ACETAMINOPHEN 2 TABLET: 5; 325 TABLET ORAL at 00:26

## 2023-07-13 RX ADMIN — IOPAMIDOL 90 ML: 755 INJECTION, SOLUTION INTRAVENOUS at 01:15

## 2023-07-13 ASSESSMENT — ENCOUNTER SYMPTOMS
ABDOMINAL PAIN: 1
BLOOD IN STOOL: 0
HEMATURIA: 0
BACK PAIN: 1

## 2023-07-13 ASSESSMENT — ACTIVITIES OF DAILY LIVING (ADL): ADLS_ACUITY_SCORE: 35

## 2023-07-13 NOTE — ED PROVIDER NOTES
NAME: Alexy Bridges  AGE: 53 year old male  YOB: 1970  MRN: [id number]  EVALUATION DATE & TIME: 2023 11:09 PM    PCP: Merlin Elizalde    ED PROVIDER: Andrew Ghosh M.D.      Chief Complaint   Patient presents with     Rib Injury         FINAL IMPRESSION:  1. Closed fracture of one rib of left side, initial encounter    2. Fall, initial encounter    3. Abdominal wall hematoma, initial encounter        MEDICAL DECISION MAKIN:52 PM I met with the patient, obtained history, performed an initial exam, and discussed options and plan for diagnostics and treatment here in the ED.   2:00 AM We discussed the plan for discharge and the patient is agreeable. Reviewed supportive cares, symptomatic treatment, outpatient follow up, and reasons to return to the Emergency Department. Patient to be discharged by ED RN.    Patient was clinically assessed and consented to treatment. After assessment, medical decision making and workup were discussed with the patient. The patient was agreeable to plan for testing, workup, and treatment.  Pertinent Labs & Imaging studies reviewed. (See chart for details)       Medical Decision Making    History:    Supplemental history from: Documented in chart, if applicable    External Record(s) reviewed: Documented in chart, if applicable.    Work Up:    Chart documentation includes differential considered and any EKGs or imaging independently interpreted by provider, where specified.    In additional to work up documented, I considered the following work up: Documented in chart, if applicable.    External consultation:    Discussion of management with another provider: Documented in chart, if applicable    Complicating factors:    Care impacted by chronic illness: N/A    Care affected by social determinants of health: N/A    Disposition considerations: Discharge. I prescribed additional prescription strength medication(s) as charted. See documentation for any  additional details.        Alexy Bridges is a 53 year old male who presents with left rib injury.   Differential diagnosis includes but not limited to left rib fracture, splenic laceration, kidney laceration, abdominal wall hematoma, pneumothorax, hollow organ injury..  Patient with fall and contusion to left abdomen/chest wall.  X-rays from triage did reveal 8 rib fracture.  No pneumothorax or hemothorax.  Patient does have a lot of bruising just below the rib cages on the abdomen.  He is tender there but not in the flank, no signs of peritonitis.  After discussion with patient given the concern regarding the fall and the large hematoma I did send patient for CT scan.  Spleen and kidney were intact and no injury to the colon.  There was an abdominal hematoma noted and we discussed care for this.  Patient also will be given pain medication to go home on for the rib fracture and will need to follow-up with his primary doctor to recheck things.  Patient's not on blood thinners and was comfortable going home on medication will be plan for discharge home.    0 minutes of critical care time    MEDICATIONS GIVEN IN THE EMERGENCY:  Medications   ketorolac (TORADOL) injection 15 mg (15 mg Intravenous $Given 7/13/23 0025)   HYDROcodone-acetaminophen (NORCO) 5-325 MG per tablet 2 tablet (2 tablets Oral $Given 7/13/23 0026)   iopamidol (ISOVUE-370) solution 90 mL (90 mLs Intravenous $Given 7/13/23 0115)   0.9% sodium chloride BOLUS (0 mLs Intravenous Stopped 7/13/23 0206)       NEW PRESCRIPTIONS STARTED AT TODAY'S ER VISIT:  Discharge Medication List as of 7/13/2023  2:08 AM      START taking these medications    Details   HYDROcodone-acetaminophen (NORCO) 5-325 MG tablet Take 1 tablet by mouth every 6 hours as needed for severe pain, Disp-10 tablet, R-0, Local Print                =================================================================    HPI    Patient information was obtained from: Patient    Use of :  N/A      Alexy Bridges is a 53 year old male with no pertinent medical history, who presents with rib pain.    The patient reports three days of right lower rib pain which started three days ago after he fell and hit his rib and upper abdomen on a boat hoist.  He reports pain as well in his LUQ and into his back.  He also notes bruising to the LUQ. He has been taking Ibuprofen for his pain.  He denies any blood in stool, hematuria, or other complaints at this time.      REVIEW OF SYSTEMS   Review of Systems   Respiratory:        Positive for left lower rib pain   Gastrointestinal: Positive for abdominal pain (LUQ). Negative for blood in stool.   Genitourinary: Negative for hematuria.   Musculoskeletal: Positive for back pain (left middle).   Skin:        Positive for bruising to LUQ abdomen   All other systems reviewed and are negative.       PAST MEDICAL HISTORY:  No past medical history on file.    PAST SURGICAL HISTORY:  No past surgical history on file.    CURRENT MEDICATIONS:    No current facility-administered medications for this encounter.    Current Outpatient Medications:      HYDROcodone-acetaminophen (NORCO) 5-325 MG tablet, Take 1 tablet by mouth every 6 hours as needed for severe pain, Disp: 10 tablet, Rfl: 0     multivitamin, therapeutic (THERA-VIT) TABS tablet, Take 1 tablet by mouth daily, Disp: , Rfl:      Omega-3 1000 MG capsule, Take 1 g by mouth, Disp: , Rfl:     ALLERGIES:  Allergies   Allergen Reactions     Tobramycin Swelling     Eye drops, Other reaction(s): Edema, Eye drops  Eye drops  Other reaction(s): Edema  Eye drops  Eye drops         FAMILY HISTORY:  Family History   Problem Relation Age of Onset     Pancreatitis Mother      Diabetes Mother      Chronic Obstructive Pulmonary Disease Father      Aortic stenosis Father      No Known Problems Sister      No Known Problems Brother      No Known Problems Sister        SOCIAL HISTORY:   Social History     Socioeconomic History     Marital  status:    Tobacco Use     Smoking status: Former     Packs/day: 0.50     Years: 10.00     Pack years: 5.00     Types: Cigarettes     Quit date: 2007     Years since quittin.5     Smokeless tobacco: Never   Substance and Sexual Activity     Alcohol use: Yes     Drug use: No       PHYSICAL EXAM:    Vitals: /77   Pulse 52   Temp 97.9  F (36.6  C) (Oral)   Resp 18   Ht 1.829 m (6')   Wt 97.5 kg (215 lb)   SpO2 98%   BMI 29.16 kg/m     Physical Exam  Vitals and nursing note reviewed.   Constitutional:       General: He is not in acute distress.     Appearance: Normal appearance. He is normal weight. He is not ill-appearing or toxic-appearing.   HENT:      Head: Normocephalic and atraumatic.   Cardiovascular:      Rate and Rhythm: Normal rate and regular rhythm.      Heart sounds: Normal heart sounds.   Pulmonary:      Effort: Pulmonary effort is normal. No respiratory distress.      Breath sounds: Normal breath sounds.   Chest:      Chest wall: Tenderness present.   Abdominal:      General: Abdomen is flat. There is no distension.      Tenderness: There is abdominal tenderness. There is no right CVA tenderness, left CVA tenderness, guarding or rebound.       Musculoskeletal:         General: Signs of injury present. No swelling, tenderness or deformity.      Right forearm: No tenderness or bony tenderness.        Arms:       Cervical back: Normal range of motion and neck supple.   Skin:     General: Skin is warm and dry.      Findings: Bruising present.   Neurological:      General: No focal deficit present.      Mental Status: He is alert.      Coordination: Coordination normal.   Psychiatric:         Behavior: Behavior normal.           LAB:  All pertinent labs reviewed and interpreted.  Labs Ordered and Resulted from Time of ED Arrival to Time of ED Departure   COMPREHENSIVE METABOLIC PANEL - Abnormal       Result Value    Sodium 141      Potassium 3.9      Chloride 109 (*)     Carbon  Dioxide (CO2) 23      Anion Gap 9      Urea Nitrogen 25 (*)     Creatinine 1.33 (*)     Calcium 8.9      Glucose 109      Alkaline Phosphatase 56      AST 20      ALT 23      Protein Total 6.6      Albumin 3.7      Bilirubin Total 0.4      GFR Estimate 65     CBC WITH PLATELETS AND DIFFERENTIAL    WBC Count 6.1      RBC Count 4.69      Hemoglobin 14.9      Hematocrit 43.4      MCV 93      MCH 31.8      MCHC 34.3      RDW 13.1      Platelet Count 205      % Neutrophils 57      % Lymphocytes 31      % Monocytes 8      % Eosinophils 2      % Basophils 1      % Immature Granulocytes 1      NRBCs per 100 WBC 0      Absolute Neutrophils 3.5      Absolute Lymphocytes 1.9      Absolute Monocytes 0.5      Absolute Eosinophils 0.1      Absolute Basophils 0.0      Absolute Immature Granulocytes 0.0      Absolute NRBCs 0.0         RADIOLOGY:  CT Abdomen Pelvis w Contrast   Final Result   IMPRESSION:    1.  Small subcutaneous contusion of the left lateral abdominal wall. No other evidence of acute traumatic abnormality to the abdomen or pelvis. No definite visualized rib fracture.   2.  Diffuse hepatic steatosis.      Ribs XR, unilat 3 views + PA chest,  left   Final Result   IMPRESSION: Cardiomediastinal silhouette within normal limits. No focal consolidation, pleural effusion or visible pneumothorax. Left rib 8 fracture.            PROCEDURES:   Procedures       I, Woo Fritz, am serving as a scribe to document services personally performed by Dr. Andrew Ghosh  based on my observation and the provider's statements to me. I, Andrew Ghosh MD attest that Woo Fritz is acting in a scribe capacity, has observed my performance of the services and has documented them in accordance with my direction.      Andrew Ghosh M.D.  Emergency Medicine  [facility] Emergency Department      Andrew Ghosh MD  07/13/23 5616

## 2023-07-13 NOTE — ED TRIAGE NOTES
Pt fell from part of a boat hoist on Sunday and hit L chest wall on part of hoist. Pt reports persistent severe pain and tenderness.      Triage Assessment     Row Name 07/12/23 1371       Triage Assessment (Adult)    Airway WDL WDL       Respiratory WDL    Respiratory WDL WDL       Skin Circulation/Temperature WDL    Skin Circulation/Temperature WDL WDL       Cardiac WDL    Cardiac WDL WDL       Peripheral/Neurovascular WDL    Peripheral Neurovascular WDL WDL       Cognitive/Neuro/Behavioral WDL    Cognitive/Neuro/Behavioral WDL WDL

## 2023-09-16 ENCOUNTER — HEALTH MAINTENANCE LETTER (OUTPATIENT)
Age: 53
End: 2023-09-16

## 2024-11-09 ENCOUNTER — HEALTH MAINTENANCE LETTER (OUTPATIENT)
Age: 54
End: 2024-11-09

## 2025-05-09 ENCOUNTER — MYC MEDICAL ADVICE (OUTPATIENT)
Dept: FAMILY MEDICINE | Facility: CLINIC | Age: 55
End: 2025-05-09

## 2025-05-12 ENCOUNTER — TELEPHONE (OUTPATIENT)
Dept: EMERGENCY MEDICINE | Facility: CLINIC | Age: 55
End: 2025-05-12
Payer: COMMERCIAL

## 2025-05-12 DIAGNOSIS — H10.33 ACUTE BACTERIAL CONJUNCTIVITIS OF BOTH EYES: ICD-10-CM

## 2025-05-12 NOTE — TELEPHONE ENCOUNTER
Patient called back and would like the Prednisolone to be sent to [facility] in [place]. Patient also state's he is willing to pay for medication if his insurance did not cover it

## 2025-05-13 DIAGNOSIS — H10.33 ACUTE BACTERIAL CONJUNCTIVITIS OF BOTH EYES: ICD-10-CM

## 2025-05-13 RX ORDER — OFLOXACIN 3 MG/ML
SOLUTION/ DROPS OPHTHALMIC
Qty: 5 ML | Refills: 0 | Status: SHIPPED | OUTPATIENT
Start: 2025-05-13

## 2025-05-13 NOTE — TELEPHONE ENCOUNTER
Medication pended for provider review. Routing to provider to review and prescribe as appropriate.    Jillian Tolliver RN  [facility]

## 2025-05-13 NOTE — TELEPHONE ENCOUNTER
Medication Question or Refill    Contacts       Contact Date/Time Type Contact Phone/Fax    05/12/2025 04:30 PM CDT Phone (Incoming) Alexy Bridges (Self) [phone] (M)    05/13/2025 11:35 AM CDT Phone (Incoming) Alexy Bridges (Self)         Patient calling to ask provider to go ahead and send the following prescription to the [facility] below in [place], [place].  Patient states he understands insurance has denied the prednisolone acetate eye drops. Patient states he will private pay for this eye medication.  Patient said he really needs the drops and is hoping to get them today.    Please Advise.    Patient reports he already has the ofloxacin eye drops.      What medication are you calling about (include dose and sig)?:     prednisoLONE acetate (PRED MILD) 0.12 % ophthalmic suspension 5 mL 0 5/13/2025 -- No   Sig - Route: Place 1-2 drops into both eyes 2 times daily. For max 2 days - Both Eyes       Preferred Pharmacy:       [facility]/pharmacy #[id number] - [facility] [address]  [address] [facility] [address] [address]  Phone: [phone] Fax: [phone]      Controlled Substance Agreement on file:   CSA -- Patient Level:    CSA: None found at the patient level.       Who prescribed the medication?: Katey Marcelino CNP    Do you need a refill? Yes    Could we send this information to you in [facility] or would you prefer to receive a phone call?:   Patient would prefer a phone call   Okay to leave a detailed message?: Yes at Cell number on file:    Telephone Information:   Mobile [phone]

## 2025-05-13 NOTE — TELEPHONE ENCOUNTER
Believed eye redness was related to allergic conjunctivitis- had advised Zaditor or Pataday.  If patient has not started ofloxacin eye drops, unsure benefit of starting as it was initially prescribed 5/9/2025.   Prednisone eye drops denied by insurance- which patient is aware.

## 2025-05-13 NOTE — TELEPHONE ENCOUNTER
Pt called back and stated he was told insurance is denying but will pay put pf poscket.  Needs sent to [facility] in [place].    Please call him when sent

## 2025-05-14 NOTE — TELEPHONE ENCOUNTER
Please also see additional telephone medication request, from the patient, from 5/12/25, regarding request for prednisolone acetate 1% ophthalmic drops.    Ying Alan RN, BSN  [facility]

## 2025-05-14 NOTE — TELEPHONE ENCOUNTER
General Call    Contacts       Contact Date/Time Type Contact Phone/Fax    05/12/2025 04:30 PM CDT Phone (Incoming) Alexy Bridges (Self) [phone] (M)    05/13/2025 11:35 AM CDT Phone (Incoming) Alexy Bridges (Self)     05/14/2025 02:55 PM CDT Phone (Incoming) Alexy Bridges (Self) [phone] (M)          Reason for Call:  medication change request. The Rx sent yesterday is to expensive. Pt requesting Rx for the below Rx to be sent which is cheaper and more affordable for him and insurance will cover it.    What are your questions or concerns:  medication change request. The Rx sent yesterday is to expensive. Pt requesting Rx for the below Rx to be sent which is cheaper and more affordable for him and insurance will cover it.    Pt states he has called around 5 times to get a new Rx sent to the [facility] pharmacy in [place], [place]. Please Pt and let him know when the new Rx has been sent. Pt is requesting a high priority as he is leaving out of town.    prednisoLONE Acetate 1 % Ophthalmic Suspension (PRED FORTE)     Date of last appointment with provider: 05/09/2025    Could we send this information to you in [facility] or would you prefer to receive a phone call?:   Patient would prefer a phone call     Okay to leave a detailed message?: Yes at Cell number on file:    Telephone Information:   Mobile [phone]     Hamida Kelley

## 2025-08-11 RX ORDER — LOTEPREDNOL ETABONATE 5 MG/G
GEL OPHTHALMIC
Refills: 0 | OUTPATIENT
Start: 2025-08-11